# Patient Record
Sex: FEMALE | Race: WHITE | NOT HISPANIC OR LATINO | Employment: FULL TIME | URBAN - METROPOLITAN AREA
[De-identification: names, ages, dates, MRNs, and addresses within clinical notes are randomized per-mention and may not be internally consistent; named-entity substitution may affect disease eponyms.]

---

## 2023-03-25 LAB
CHLAMYDIA TRACH., AMPLIFIED: NEGATIVE
N. GONORRHEA, AMPLIFIED: NEGATIVE
TRICHOMONAS VAGINALIS: NEGATIVE
URINE CULTURE: NORMAL

## 2023-03-26 LAB
CLUE CELLS: NORMAL
NUGENT SCORE: NORMAL
YEAST: NORMAL

## 2023-06-15 ENCOUNTER — APPOINTMENT (OUTPATIENT)
Dept: PRIMARY CARE | Facility: CLINIC | Age: 23
End: 2023-06-15
Payer: COMMERCIAL

## 2023-07-11 ENCOUNTER — LAB (OUTPATIENT)
Dept: LAB | Facility: LAB | Age: 23
End: 2023-07-11
Payer: COMMERCIAL

## 2023-07-11 ENCOUNTER — OFFICE VISIT (OUTPATIENT)
Dept: PRIMARY CARE | Facility: CLINIC | Age: 23
End: 2023-07-11
Payer: COMMERCIAL

## 2023-07-11 VITALS
SYSTOLIC BLOOD PRESSURE: 124 MMHG | TEMPERATURE: 97.7 F | DIASTOLIC BLOOD PRESSURE: 78 MMHG | HEIGHT: 67 IN | HEART RATE: 64 BPM | BODY MASS INDEX: 42.22 KG/M2 | WEIGHT: 269 LBS

## 2023-07-11 DIAGNOSIS — R10.9 FLANK PAIN: ICD-10-CM

## 2023-07-11 DIAGNOSIS — R11.2 NAUSEA AND VOMITING, UNSPECIFIED VOMITING TYPE: ICD-10-CM

## 2023-07-11 DIAGNOSIS — R10.11 RUQ PAIN: Primary | ICD-10-CM

## 2023-07-11 DIAGNOSIS — R10.11 RUQ PAIN: ICD-10-CM

## 2023-07-11 LAB
ALANINE AMINOTRANSFERASE (SGPT) (U/L) IN SER/PLAS: 14 U/L (ref 7–45)
ALBUMIN (G/DL) IN SER/PLAS: 4.5 G/DL (ref 3.4–5)
ALKALINE PHOSPHATASE (U/L) IN SER/PLAS: 54 U/L (ref 33–110)
AMYLASE (U/L) IN SER/PLAS: 32 U/L (ref 29–103)
ANION GAP IN SER/PLAS: 11 MMOL/L (ref 10–20)
APPEARANCE, URINE: ABNORMAL
ASPARTATE AMINOTRANSFERASE (SGOT) (U/L) IN SER/PLAS: 14 U/L (ref 9–39)
BACTERIA, URINE: ABNORMAL /HPF
BASOPHILS (10*3/UL) IN BLOOD BY AUTOMATED COUNT: 0.05 X10E9/L (ref 0–0.1)
BASOPHILS/100 LEUKOCYTES IN BLOOD BY AUTOMATED COUNT: 0.7 % (ref 0–2)
BILIRUBIN TOTAL (MG/DL) IN SER/PLAS: 0.5 MG/DL (ref 0–1.2)
BILIRUBIN, URINE: NEGATIVE
BLOOD, URINE: NEGATIVE
C REACTIVE PROTEIN (MG/L) IN SER/PLAS: 0.8 MG/DL
CALCIUM (MG/DL) IN SER/PLAS: 9.3 MG/DL (ref 8.6–10.3)
CARBON DIOXIDE, TOTAL (MMOL/L) IN SER/PLAS: 25 MMOL/L (ref 21–32)
CHLORIDE (MMOL/L) IN SER/PLAS: 104 MMOL/L (ref 98–107)
COLOR, URINE: YELLOW
CREATININE (MG/DL) IN SER/PLAS: 0.7 MG/DL (ref 0.5–1.05)
EOSINOPHILS (10*3/UL) IN BLOOD BY AUTOMATED COUNT: 0.06 X10E9/L (ref 0–0.7)
EOSINOPHILS/100 LEUKOCYTES IN BLOOD BY AUTOMATED COUNT: 0.8 % (ref 0–6)
ERYTHROCYTE DISTRIBUTION WIDTH (RATIO) BY AUTOMATED COUNT: 12.7 % (ref 11.5–14.5)
ERYTHROCYTE MEAN CORPUSCULAR HEMOGLOBIN CONCENTRATION (G/DL) BY AUTOMATED: 31.8 G/DL (ref 32–36)
ERYTHROCYTE MEAN CORPUSCULAR VOLUME (FL) BY AUTOMATED COUNT: 90 FL (ref 80–100)
ERYTHROCYTES (10*6/UL) IN BLOOD BY AUTOMATED COUNT: 3.99 X10E12/L (ref 4–5.2)
GFR FEMALE: >90 ML/MIN/1.73M2
GLUCOSE (MG/DL) IN SER/PLAS: 91 MG/DL (ref 74–99)
GLUCOSE, URINE: NEGATIVE MG/DL
HEMATOCRIT (%) IN BLOOD BY AUTOMATED COUNT: 35.9 % (ref 36–46)
HEMOGLOBIN (G/DL) IN BLOOD: 11.4 G/DL (ref 12–16)
IMMATURE GRANULOCYTES/100 LEUKOCYTES IN BLOOD BY AUTOMATED COUNT: 0.1 % (ref 0–0.9)
KETONES, URINE: NEGATIVE MG/DL
LEUKOCYTE ESTERASE, URINE: ABNORMAL
LEUKOCYTES (10*3/UL) IN BLOOD BY AUTOMATED COUNT: 7.3 X10E9/L (ref 4.4–11.3)
LIPASE (U/L) IN SER/PLAS: 15 U/L (ref 9–82)
LYMPHOCYTES (10*3/UL) IN BLOOD BY AUTOMATED COUNT: 1.6 X10E9/L (ref 1.2–4.8)
LYMPHOCYTES/100 LEUKOCYTES IN BLOOD BY AUTOMATED COUNT: 22 % (ref 13–44)
MONOCYTES (10*3/UL) IN BLOOD BY AUTOMATED COUNT: 0.52 X10E9/L (ref 0.1–1)
MONOCYTES/100 LEUKOCYTES IN BLOOD BY AUTOMATED COUNT: 7.1 % (ref 2–10)
MUCUS, URINE: ABNORMAL /LPF
NEUTROPHILS (10*3/UL) IN BLOOD BY AUTOMATED COUNT: 5.04 X10E9/L (ref 1.2–7.7)
NEUTROPHILS/100 LEUKOCYTES IN BLOOD BY AUTOMATED COUNT: 69.3 % (ref 40–80)
NITRITE, URINE: NEGATIVE
PH, URINE: 6 (ref 5–8)
PLATELETS (10*3/UL) IN BLOOD AUTOMATED COUNT: 273 X10E9/L (ref 150–450)
POTASSIUM (MMOL/L) IN SER/PLAS: 4.2 MMOL/L (ref 3.5–5.3)
PROTEIN TOTAL: 7.3 G/DL (ref 6.4–8.2)
PROTEIN, URINE: NEGATIVE MG/DL
RBC, URINE: 2 /HPF (ref 0–5)
SODIUM (MMOL/L) IN SER/PLAS: 136 MMOL/L (ref 136–145)
SPECIFIC GRAVITY, URINE: 1.02 (ref 1–1.03)
SQUAMOUS EPITHELIAL CELLS, URINE: 4 /HPF
UREA NITROGEN (MG/DL) IN SER/PLAS: 10 MG/DL (ref 6–23)
UROBILINOGEN, URINE: <2 MG/DL (ref 0–1.9)
WBC, URINE: 5 /HPF (ref 0–5)

## 2023-07-11 PROCEDURE — 83690 ASSAY OF LIPASE: CPT

## 2023-07-11 PROCEDURE — 80053 COMPREHEN METABOLIC PANEL: CPT

## 2023-07-11 PROCEDURE — 99203 OFFICE O/P NEW LOW 30 MIN: CPT | Performed by: INTERNAL MEDICINE

## 2023-07-11 PROCEDURE — 36415 COLL VENOUS BLD VENIPUNCTURE: CPT

## 2023-07-11 PROCEDURE — 82150 ASSAY OF AMYLASE: CPT

## 2023-07-11 PROCEDURE — 85025 COMPLETE CBC W/AUTO DIFF WBC: CPT

## 2023-07-11 PROCEDURE — 81001 URINALYSIS AUTO W/SCOPE: CPT

## 2023-07-11 PROCEDURE — 86140 C-REACTIVE PROTEIN: CPT

## 2023-07-11 PROCEDURE — 1036F TOBACCO NON-USER: CPT | Performed by: INTERNAL MEDICINE

## 2023-07-11 RX ORDER — ONDANSETRON 4 MG/1
1 TABLET, ORALLY DISINTEGRATING ORAL EVERY 8 HOURS PRN
COMMUNITY
Start: 2022-07-01 | End: 2023-07-11 | Stop reason: SDUPTHER

## 2023-07-11 RX ORDER — FLUCONAZOLE 150 MG/1
TABLET ORAL
COMMUNITY
Start: 2022-07-12 | End: 2023-07-11 | Stop reason: ALTCHOICE

## 2023-07-11 RX ORDER — COPPER 313.4 MG/1
1 INTRAUTERINE DEVICE INTRAUTERINE
COMMUNITY
End: 2024-03-25 | Stop reason: WASHOUT

## 2023-07-11 RX ORDER — ONDANSETRON 4 MG/1
4 TABLET, ORALLY DISINTEGRATING ORAL EVERY 8 HOURS PRN
Qty: 20 TABLET | Refills: 2 | Status: SHIPPED | OUTPATIENT
Start: 2023-07-11

## 2023-07-11 ASSESSMENT — ENCOUNTER SYMPTOMS
MUSCULOSKELETAL NEGATIVE: 1
HEMATURIA: 0
CARDIOVASCULAR NEGATIVE: 1
ROS GI COMMENTS: SEE HPI
RESPIRATORY NEGATIVE: 1
HEMATOLOGIC/LYMPHATIC NEGATIVE: 1
NEUROLOGICAL NEGATIVE: 1
PSYCHIATRIC NEGATIVE: 1
DIFFICULTY URINATING: 0
DIARRHEA: 1

## 2023-07-11 NOTE — PROGRESS NOTES
"Subjective   Patient ID: Charlotte Moody is a 22 y.o. female who presents for Establish Care (Pt here to establish) and Abdominal Pain (Nausea right side x 7 days consistent and vomiting every 2-3 days).    Patient here to get established she has been having right flank and right upper quadrant pain and nausea vomiting diarrhea for a week no fever she is feeling nauseous she had similar episode last year and had a CAT scan which was unremarkable she does have positive Lima sign on the physical exam so we need to rule her out for gallstones and acute cholecystitis.         Review of Systems   HENT: Negative.     Respiratory: Negative.     Cardiovascular: Negative.    Gastrointestinal:  Positive for diarrhea.        See HPI   Genitourinary:  Negative for difficulty urinating and hematuria.   Musculoskeletal: Negative.    Neurological: Negative.    Hematological: Negative.    Psychiatric/Behavioral: Negative.     All other systems reviewed and are negative.      Objective   /78   Pulse 64   Temp 36.5 °C (97.7 °F) (Temporal)   Ht 1.689 m (5' 6.5\")   Wt 122 kg (269 lb)   LMP 07/03/2023   BMI 42.77 kg/m²     Physical Exam  Vitals reviewed.   Constitutional:       Appearance: Normal appearance.   HENT:      Head: Atraumatic.   Eyes:      Pupils: Pupils are equal, round, and reactive to light.   Cardiovascular:      Rate and Rhythm: Normal rate and regular rhythm.      Pulses: Normal pulses.   Pulmonary:      Effort: Pulmonary effort is normal.      Breath sounds: Normal breath sounds.   Abdominal:      Palpations: There is no mass.      Tenderness: There is abdominal tenderness. There is right CVA tenderness. There is no guarding or rebound.      Hernia: No hernia is present.   Neurological:      General: No focal deficit present.      Mental Status: She is alert and oriented to person, place, and time.         Assessment/Plan   Problem List Items Addressed This Visit       RUQ pain - Primary    " Relevant Orders    CBC and Auto Differential (Completed)    Comprehensive Metabolic Panel (Completed)    C-Reactive Protein (Completed)    Amylase (Completed)    Lipase (Completed)    US right upper quadrant    Nausea & vomiting    Relevant Medications    ondansetron ODT (Zofran-ODT) 4 mg disintegrating tablet    Other Relevant Orders    CBC and Auto Differential (Completed)    Comprehensive Metabolic Panel (Completed)    C-Reactive Protein (Completed)    Amylase (Completed)    Lipase (Completed)    US right upper quadrant    Flank pain    Relevant Orders    Urinalysis with Reflex Microscopic (Completed)     Patient will get ultrasound and blood work to rule out gallbladder disease and gallstones also with the right flank pain urinalysis will be done I have not ordered a serum pregnancy test because she has IUD and her last menstrual period was 2 weeks ago reviewed available medical records on her from EMR including her CAT scan from last year.  She should go to ER if she has her abdominal pain gets worse.

## 2023-07-18 ENCOUNTER — LAB (OUTPATIENT)
Dept: LAB | Facility: LAB | Age: 23
End: 2023-07-18
Payer: COMMERCIAL

## 2023-07-18 ENCOUNTER — OFFICE VISIT (OUTPATIENT)
Dept: PRIMARY CARE | Facility: CLINIC | Age: 23
End: 2023-07-18
Payer: COMMERCIAL

## 2023-07-18 VITALS
SYSTOLIC BLOOD PRESSURE: 122 MMHG | TEMPERATURE: 97.8 F | DIASTOLIC BLOOD PRESSURE: 76 MMHG | HEART RATE: 64 BPM | BODY MASS INDEX: 42.93 KG/M2 | WEIGHT: 270 LBS

## 2023-07-18 DIAGNOSIS — Q24.8 PERICARDIAL CYST (HHS-HCC): ICD-10-CM

## 2023-07-18 DIAGNOSIS — R10.9 FLANK PAIN: ICD-10-CM

## 2023-07-18 DIAGNOSIS — R10.9 FLANK PAIN: Primary | ICD-10-CM

## 2023-07-18 DIAGNOSIS — Z97.5 IUD (INTRAUTERINE DEVICE) IN PLACE: ICD-10-CM

## 2023-07-18 LAB
APPEARANCE, URINE: CLEAR
BILIRUBIN, URINE: NEGATIVE
BLOOD, URINE: NEGATIVE
COLOR, URINE: YELLOW
GLUCOSE, URINE: NEGATIVE MG/DL
KETONES, URINE: NEGATIVE MG/DL
LEUKOCYTE ESTERASE, URINE: NEGATIVE
NITRITE, URINE: NEGATIVE
PH, URINE: 5 (ref 5–8)
PROTEIN, URINE: NEGATIVE MG/DL
SPECIFIC GRAVITY, URINE: 1.02 (ref 1–1.03)
UROBILINOGEN, URINE: <2 MG/DL (ref 0–1.9)

## 2023-07-18 PROCEDURE — 82525 ASSAY OF COPPER: CPT

## 2023-07-18 PROCEDURE — 36415 COLL VENOUS BLD VENIPUNCTURE: CPT

## 2023-07-18 PROCEDURE — 81003 URINALYSIS AUTO W/O SCOPE: CPT

## 2023-07-18 PROCEDURE — 99213 OFFICE O/P EST LOW 20 MIN: CPT | Performed by: INTERNAL MEDICINE

## 2023-07-18 PROCEDURE — 1036F TOBACCO NON-USER: CPT | Performed by: INTERNAL MEDICINE

## 2023-07-18 PROCEDURE — 87086 URINE CULTURE/COLONY COUNT: CPT

## 2023-07-18 RX ORDER — FAMOTIDINE 20 MG/1
20 TABLET, FILM COATED ORAL 2 TIMES DAILY
COMMUNITY
Start: 2023-07-14 | End: 2024-03-25 | Stop reason: WASHOUT

## 2023-07-18 ASSESSMENT — ENCOUNTER SYMPTOMS
CARDIOVASCULAR NEGATIVE: 1
CONSTITUTIONAL NEGATIVE: 1
RESPIRATORY NEGATIVE: 1

## 2023-07-18 NOTE — PROGRESS NOTES
"Subjective   Patient ID: Charlotte Moody is a 22 y.o. female who presents for Follow-up (Pt here for Emergency dept follow up. Abdominal pain nausea and vomiting  Found a \"cyst\" on the heart through CT).    Patient is a hospital ER follow-up she had gone to ER for abdominal pain nausea vomiting CAT scan and ultrasound were negative for any acute pancreatic or gallbladder issues she is still having nausea but no abdominal pain she does have a copper IUD and that could potentially be the reason.  She does have right flank pain         Review of Systems   Constitutional: Negative.    HENT: Negative.     Respiratory: Negative.     Cardiovascular: Negative.    Gastrointestinal:         See HPI,Still has Nausea       Objective   /76   Pulse 64   Temp 36.6 °C (97.8 °F) (Temporal)   Wt 122 kg (270 lb)   LMP 07/03/2023   BMI 42.93 kg/m²     Physical Exam  Constitutional:       Appearance: Normal appearance. She is obese.   HENT:      Head: Atraumatic.   Eyes:      Conjunctiva/sclera: Conjunctivae normal.   Abdominal:      Palpations: Abdomen is soft. There is no mass.      Tenderness: There is right CVA tenderness.   Musculoskeletal:      Right lower leg: No edema.      Left lower leg: No edema.   Neurological:      General: No focal deficit present.      Mental Status: She is alert. Mental status is at baseline.       Assessment/Plan   Problem List Items Addressed This Visit       Flank pain - Primary    Relevant Orders    Urinalysis with Reflex Microscopic (Completed)    Urine Culture (Completed)    Pericardial cyst    Relevant Orders    Referral to Cardiology    IUD (intrauterine device) in place    Relevant Orders    Copper, serum (Completed)     Patient will have urinalysis and culture since she had abnormal urine analysis repeat urinalysis is unremarkable she will also be referred to GYN regarding her potential issues with the copper IUD.  I reviewed her ER records she had a CAT scan which showed " pericardial cyst which is an incidental finding she will be referred to cardiology for their opinion and echocardiogram.

## 2023-07-20 LAB
COPPER: 138.6 UG/DL (ref 80–155)
URINE CULTURE: NORMAL

## 2023-07-23 PROBLEM — Q24.8 PERICARDIAL CYST (HHS-HCC): Status: ACTIVE | Noted: 2023-07-23

## 2023-07-23 PROBLEM — Z97.5 IUD (INTRAUTERINE DEVICE) IN PLACE: Status: ACTIVE | Noted: 2023-07-23

## 2023-07-25 ENCOUNTER — APPOINTMENT (OUTPATIENT)
Dept: PRIMARY CARE | Facility: CLINIC | Age: 23
End: 2023-07-25
Payer: COMMERCIAL

## 2023-08-23 PROBLEM — M62.81 MUSCLE WEAKNESS: Status: ACTIVE | Noted: 2018-07-12

## 2023-08-23 PROBLEM — M65.4 DE QUERVAIN'S TENOSYNOVITIS, RIGHT: Status: ACTIVE | Noted: 2020-06-18

## 2023-08-23 PROBLEM — N63.20 LEFT BREAST LUMP: Status: ACTIVE | Noted: 2023-08-23

## 2023-08-23 PROBLEM — R30.0 DYSURIA: Status: ACTIVE | Noted: 2023-08-23

## 2023-08-23 PROBLEM — N93.9 ABNORMAL VAGINAL BLEEDING: Status: ACTIVE | Noted: 2023-08-23

## 2023-08-23 PROBLEM — B37.2 CANDIDAL SKIN INFECTION: Status: ACTIVE | Noted: 2023-08-23

## 2023-08-23 PROBLEM — B37.31 VAGINAL CANDIDIASIS: Status: ACTIVE | Noted: 2023-08-23

## 2023-08-23 PROBLEM — N39.0 FREQUENT UTI: Status: ACTIVE | Noted: 2023-08-23

## 2023-08-23 RX ORDER — LIDOCAINE HYDROCHLORIDE 20 MG/ML
SOLUTION OROPHARYNGEAL
COMMUNITY
Start: 2022-06-08 | End: 2024-03-25 | Stop reason: WASHOUT

## 2023-08-23 RX ORDER — FLUTICASONE PROPIONATE 50 MCG
1 SPRAY, SUSPENSION (ML) NASAL
COMMUNITY
Start: 2023-04-19 | End: 2024-03-25 | Stop reason: WASHOUT

## 2023-08-23 RX ORDER — METRONIDAZOLE 7.5 MG/G
1 GEL VAGINAL NIGHTLY
COMMUNITY
Start: 2023-03-24 | End: 2024-03-25 | Stop reason: WASHOUT

## 2023-08-23 RX ORDER — DICYCLOMINE HYDROCHLORIDE 10 MG/1
1 CAPSULE ORAL 3 TIMES DAILY PRN
COMMUNITY
Start: 2023-07-14 | End: 2024-03-25 | Stop reason: WASHOUT

## 2023-08-23 RX ORDER — CLOTRIMAZOLE AND BETAMETHASONE DIPROPIONATE 10; .64 MG/G; MG/G
1 CREAM TOPICAL 2 TIMES DAILY
COMMUNITY
Start: 2023-03-24 | End: 2024-03-25 | Stop reason: WASHOUT

## 2023-08-23 RX ORDER — PROMETHAZINE HYDROCHLORIDE 12.5 MG/1
1 TABLET ORAL EVERY 8 HOURS PRN
COMMUNITY
Start: 2023-07-14 | End: 2024-03-25 | Stop reason: WASHOUT

## 2023-09-28 LAB — SARS-COV-2 RESULT: DETECTED

## 2023-10-03 ENCOUNTER — APPOINTMENT (OUTPATIENT)
Dept: OBSTETRICS AND GYNECOLOGY | Facility: CLINIC | Age: 23
End: 2023-10-03
Payer: COMMERCIAL

## 2023-10-18 ENCOUNTER — APPOINTMENT (OUTPATIENT)
Dept: PRIMARY CARE | Facility: CLINIC | Age: 23
End: 2023-10-18
Payer: COMMERCIAL

## 2023-10-31 ENCOUNTER — APPOINTMENT (OUTPATIENT)
Dept: PRIMARY CARE | Facility: CLINIC | Age: 23
End: 2023-10-31
Payer: COMMERCIAL

## 2023-11-17 ENCOUNTER — OFFICE VISIT (OUTPATIENT)
Dept: ORTHOPEDIC SURGERY | Facility: CLINIC | Age: 23
End: 2023-11-17
Payer: COMMERCIAL

## 2023-11-17 DIAGNOSIS — M65.4 DE QUERVAIN'S TENOSYNOVITIS: ICD-10-CM

## 2023-11-17 PROCEDURE — 20550 NJX 1 TENDON SHEATH/LIGAMENT: CPT | Performed by: ORTHOPAEDIC SURGERY

## 2023-11-17 PROCEDURE — 1036F TOBACCO NON-USER: CPT | Performed by: ORTHOPAEDIC SURGERY

## 2023-11-17 PROCEDURE — L3924 HFO WITHOUT JOINTS PRE OTS: HCPCS | Performed by: ORTHOPAEDIC SURGERY

## 2023-11-17 PROCEDURE — 99214 OFFICE O/P EST MOD 30 MIN: CPT | Performed by: ORTHOPAEDIC SURGERY

## 2023-11-17 RX ORDER — BETAMETHASONE SODIUM PHOSPHATE AND BETAMETHASONE ACETATE 3; 3 MG/ML; MG/ML
6 INJECTION, SUSPENSION INTRA-ARTICULAR; INTRALESIONAL; INTRAMUSCULAR; SOFT TISSUE
Status: COMPLETED | OUTPATIENT
Start: 2023-11-17 | End: 2023-11-17

## 2023-11-17 RX ADMIN — BETAMETHASONE SODIUM PHOSPHATE AND BETAMETHASONE ACETATE 6 MG: 3; 3 INJECTION, SUSPENSION INTRA-ARTICULAR; INTRALESIONAL; INTRAMUSCULAR; SOFT TISSUE at 16:29

## 2023-11-17 ASSESSMENT — PAIN SCALES - GENERAL: PAINLEVEL_OUTOF10: 4

## 2023-11-17 ASSESSMENT — PAIN - FUNCTIONAL ASSESSMENT: PAIN_FUNCTIONAL_ASSESSMENT: 0-10

## 2023-11-17 NOTE — PROGRESS NOTES
History of Present Illness   The patient is here for her right wrist.  She has de Quervain's tenosynovitis.  I injected her and gave her a Comfort Cool splint.  The injection helped.  She is about 70% better but still has some pain.  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  She is pointing along the radial aspect of the wrist and thumb where she is getting her pain.     Review of Systems   GENERAL: Negative for malaise, significant weight loss, fever  MUSCULOSKELETAL: see HPI  NEURO:  Negative     Physical Exam  This is a female in no acute distress  Right wrist:  The skin is intact  There is no erythema or warmth  Finklestein's test is positive  She has some pain with thumb extension and abduction.  Radial pulses 2+ and palpable     Imaging  None today     Assessment   De Quervain's tenosynovitis right wrist     Plan  We will try another injection today into the first dorsal extensor compartment  Another Comfort Cool splint was supplied  Follow-up in 6 weeks, sooner if there is any problems  All questions answered    Hand / UE Inj/Asp: R extensor compartment 1 for de Quervain's tenosynovitis on 11/17/2023 4:29 PM  Indications: pain  Details: 25 G needle, radial approach  Medications: 6 mg betamethasone acet,sod phos 6 mg/mL

## 2023-12-29 ENCOUNTER — OFFICE VISIT (OUTPATIENT)
Dept: ORTHOPEDIC SURGERY | Facility: CLINIC | Age: 23
End: 2023-12-29
Payer: COMMERCIAL

## 2023-12-29 DIAGNOSIS — M65.4 DE QUERVAIN'S TENOSYNOVITIS: Primary | ICD-10-CM

## 2023-12-29 PROCEDURE — 99212 OFFICE O/P EST SF 10 MIN: CPT | Performed by: ORTHOPAEDIC SURGERY

## 2023-12-29 PROCEDURE — 1036F TOBACCO NON-USER: CPT | Performed by: ORTHOPAEDIC SURGERY

## 2023-12-29 NOTE — PROGRESS NOTES
History of Present Illness   She is here for follow-up of her right wrist de Quervain's tenosynovitis.  At the last visit she was given a Comfort Cool brace and a corticosteroid injection.  She states this has helped and she has no pain.     Review of Systems   GENERAL: Negative for malaise, significant weight loss, fever  MUSCULOSKELETAL: see HPI  NEURO:  Negative     Physical Exam  Right wrist  Skin is intact without any evidence of erythema ecchymosis or effusion  No tenderness to palpation  Full range of motion at the wrist  Negative Finkelstein's test     Imaging  None today     Assessment   Right wrist de Quervain's tenosynovitis, resolved     Plan  Follow-up as needed

## 2024-03-13 ENCOUNTER — TELEPHONE (OUTPATIENT)
Dept: OBSTETRICS AND GYNECOLOGY | Facility: CLINIC | Age: 24
End: 2024-03-13

## 2024-03-13 NOTE — TELEPHONE ENCOUNTER
5.5 wk EP NOB called ob line c/o light bleeding/spotting for the past week.  LMP 2024   Per patient, she has h/o PCOS symptoms and has struggled on getting pregnant.  Patient denies cramping or pain.  Denies constipation or recent intercourse.  Patient is requesting to have blood work done to check her pregnancy hormones to make sure they are going in the right direction.    Patient assured that spotting and/or cramping in the first part of pregnancy is not uncommon.    Patient assured that I will send a message onto Alma, whom she is scheduled to see for NOB on , and give her a call back.    In the meantime, patient should monitor the spotting/light bleeding and call if she starts saturating a pad every hour for 3 hours and/or intense cramping or pain.  Please advise

## 2024-03-25 ENCOUNTER — OFFICE VISIT (OUTPATIENT)
Dept: OBSTETRICS AND GYNECOLOGY | Facility: CLINIC | Age: 24
End: 2024-03-25
Payer: COMMERCIAL

## 2024-03-25 ENCOUNTER — HOSPITAL ENCOUNTER (OUTPATIENT)
Dept: RADIOLOGY | Facility: CLINIC | Age: 24
Discharge: HOME | End: 2024-03-25
Payer: COMMERCIAL

## 2024-03-25 VITALS
BODY MASS INDEX: 42.51 KG/M2 | HEIGHT: 66 IN | WEIGHT: 264.5 LBS | DIASTOLIC BLOOD PRESSURE: 84 MMHG | SYSTOLIC BLOOD PRESSURE: 122 MMHG

## 2024-03-25 DIAGNOSIS — Z29.13 NEED FOR RHOGAM DUE TO RH NEGATIVE MOTHER: ICD-10-CM

## 2024-03-25 DIAGNOSIS — O20.9 VAGINAL BLEEDING AFFECTING EARLY PREGNANCY (HHS-HCC): Primary | ICD-10-CM

## 2024-03-25 DIAGNOSIS — O20.9 VAGINAL BLEEDING AFFECTING EARLY PREGNANCY (HHS-HCC): ICD-10-CM

## 2024-03-25 LAB
ABO GROUP (TYPE) IN BLOOD: NORMAL
ANTIBODY SCREEN: NORMAL
B-HCG SERPL-ACNC: ABNORMAL MIU/ML
PREGNANCY TEST URINE, POC: POSITIVE
RH FACTOR (ANTIGEN D): NORMAL

## 2024-03-25 PROCEDURE — 86901 BLOOD TYPING SEROLOGIC RH(D): CPT

## 2024-03-25 PROCEDURE — 1036F TOBACCO NON-USER: CPT | Performed by: ADVANCED PRACTICE MIDWIFE

## 2024-03-25 PROCEDURE — 81025 URINE PREGNANCY TEST: CPT | Performed by: ADVANCED PRACTICE MIDWIFE

## 2024-03-25 PROCEDURE — 99213 OFFICE O/P EST LOW 20 MIN: CPT | Performed by: ADVANCED PRACTICE MIDWIFE

## 2024-03-25 PROCEDURE — 84702 CHORIONIC GONADOTROPIN TEST: CPT

## 2024-03-25 PROCEDURE — 96372 THER/PROPH/DIAG INJ SC/IM: CPT | Performed by: ADVANCED PRACTICE MIDWIFE

## 2024-03-25 PROCEDURE — 76801 OB US < 14 WKS SINGLE FETUS: CPT

## 2024-03-25 PROCEDURE — 86900 BLOOD TYPING SEROLOGIC ABO: CPT

## 2024-03-25 PROCEDURE — 76801 OB US < 14 WKS SINGLE FETUS: CPT | Performed by: OBSTETRICS & GYNECOLOGY

## 2024-03-25 PROCEDURE — 86850 RBC ANTIBODY SCREEN: CPT

## 2024-03-25 PROCEDURE — 76817 TRANSVAGINAL US OBSTETRIC: CPT

## 2024-03-25 PROCEDURE — 36415 COLL VENOUS BLD VENIPUNCTURE: CPT

## 2024-03-25 PROCEDURE — 76817 TRANSVAGINAL US OBSTETRIC: CPT | Performed by: OBSTETRICS & GYNECOLOGY

## 2024-03-25 NOTE — PROGRESS NOTES
"Add on for c/o vaginal bleeding in early pregnancy.  with an  LMP of 24, making her 7w4d today. She and her partner have been trying to get pregnant, she is feeling very anxious about the bleeding. Evelyn reports \"Light cramping\" that comes and goes, bleeding has been consistent since Friday. Was seen in the ED on 3/15/2024 for bleeding at 6 weeks, TVUS indicated IUP and likely a subchorionic hematoma.   Pt is RH negative and WAS NOT given RhoGAM in the ED. Given bleeding seen today, will draw T&S and administer RhoGAM.   Beta hCG was also ordered today and pt was added to the M U/S schedule for a viability U/S later today.     Return to care as scheduled for NOB visit should viability be confirmed by [facility].     NIECY Cloud-LEONA    "

## 2024-03-26 NOTE — TELEPHONE ENCOUNTER
Patient called ob line asking to speak with Alma.  She had a formal scan done yesterday and was advised that she had a miscarriage.  Her bleeding yesterday, after scan, was very heavy with lots of cramping/pain, she did end up passing large clots.  Today her bleeding is moreso like a light period with mild cramping.    Patient is wondering what her next steps are?  Should she be having more blood work done to make she her hcg goes down? Should she come back in for office visit    Please advise

## 2024-04-09 ENCOUNTER — OFFICE VISIT (OUTPATIENT)
Dept: OBSTETRICS AND GYNECOLOGY | Facility: CLINIC | Age: 24
End: 2024-04-09
Payer: COMMERCIAL

## 2024-04-09 ENCOUNTER — APPOINTMENT (OUTPATIENT)
Dept: OBSTETRICS AND GYNECOLOGY | Facility: CLINIC | Age: 24
End: 2024-04-09
Payer: COMMERCIAL

## 2024-04-09 VITALS — WEIGHT: 273.25 LBS | DIASTOLIC BLOOD PRESSURE: 60 MMHG | SYSTOLIC BLOOD PRESSURE: 124 MMHG | BODY MASS INDEX: 44.1 KG/M2

## 2024-04-09 DIAGNOSIS — O03.9 MISCARRIAGE (HHS-HCC): ICD-10-CM

## 2024-04-09 PROCEDURE — 1036F TOBACCO NON-USER: CPT | Performed by: ADVANCED PRACTICE MIDWIFE

## 2024-04-09 PROCEDURE — 99213 OFFICE O/P EST LOW 20 MIN: CPT | Performed by: ADVANCED PRACTICE MIDWIFE

## 2024-04-09 NOTE — PROGRESS NOTES
GYN Problem note  Charlotte Moody  is a 23 y.o. who presents with   Chief Complaint   Patient presents with    Follow-up     MAB         s/p complete SAB presents today for F/U visit. Was seen for cramping and bleeding with positive UPT at home on 3/25. Since then, Evelyn reports bleeding and cramping increased and she feels she passed everything 3/27. No bleeding in over 1 week.   Was given RhoGAM when seen on 3/25. Found out her partner is Rh +.  Discussed plans for starting a family. 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 Plans to give herself a few months, focus on school and passing the NCLEX, then start trying again.   Encouraged to continue PNVs daily and track menstrual cycles.     Physical Exam   Physical Exam  Constitutional:       Appearance: Normal appearance.   HENT:      Head: Normocephalic and atraumatic.   Pulmonary:      Effort: Pulmonary effort is normal.   Neurological:      General: No focal deficit present.      Mental Status: She is alert and oriented to person, place, and time.   Psychiatric:         Mood and Affect: Mood normal.         Behavior: Behavior normal.   Vitals reviewed.          Visit Vitals  /60            Assessment/Plan   F/U to complete SAB  MFM U/S performed on 3/25 found no evidence of gestational sac but findings were not conclusive for complete AB. Given pt's report of passing tissue and bleeding, then no bleeding in over 1 week, likely passed all POC.   Beta hCG today, if neg, no further f/u necessary. If positive, repeat urine preg test at home in 1 week, continue until negative result  Continue taking PNVs and tracking periods.     Return to care as needed      DAYO Cloud

## 2024-04-15 ENCOUNTER — LAB (OUTPATIENT)
Dept: LAB | Facility: LAB | Age: 24
End: 2024-04-15
Payer: COMMERCIAL

## 2024-04-15 DIAGNOSIS — O03.9 MISCARRIAGE (HHS-HCC): ICD-10-CM

## 2024-04-15 LAB — B-HCG SERPL-ACNC: 7 MIU/ML

## 2024-04-15 PROCEDURE — 36415 COLL VENOUS BLD VENIPUNCTURE: CPT

## 2024-04-15 PROCEDURE — 84702 CHORIONIC GONADOTROPIN TEST: CPT

## 2024-06-20 ENCOUNTER — TELEPHONE (OUTPATIENT)
Dept: OBSTETRICS AND GYNECOLOGY | Facility: CLINIC | Age: 24
End: 2024-06-20

## 2024-06-20 NOTE — TELEPHONE ENCOUNTER
Patient left message on ob line requesting to have BHCG drawn . Recent + UPT, unsure of LMP due to recent miscarriage.    Please advise

## 2024-07-22 ENCOUNTER — APPOINTMENT (OUTPATIENT)
Dept: OBSTETRICS AND GYNECOLOGY | Facility: CLINIC | Age: 24
End: 2024-07-22
Payer: COMMERCIAL

## 2025-01-02 ENCOUNTER — OFFICE VISIT (OUTPATIENT)
Dept: ORTHOPEDIC SURGERY | Facility: CLINIC | Age: 25
End: 2025-01-02
Payer: COMMERCIAL

## 2025-01-02 ENCOUNTER — APPOINTMENT (OUTPATIENT)
Dept: RADIOLOGY | Facility: HOSPITAL | Age: 25
End: 2025-01-02
Payer: COMMERCIAL

## 2025-01-02 ENCOUNTER — HOSPITAL ENCOUNTER (EMERGENCY)
Facility: HOSPITAL | Age: 25
Discharge: HOME | End: 2025-01-02
Payer: COMMERCIAL

## 2025-01-02 VITALS
OXYGEN SATURATION: 98 % | HEIGHT: 66 IN | RESPIRATION RATE: 18 BRPM | TEMPERATURE: 97.7 F | SYSTOLIC BLOOD PRESSURE: 150 MMHG | BODY MASS INDEX: 46.61 KG/M2 | DIASTOLIC BLOOD PRESSURE: 76 MMHG | WEIGHT: 290 LBS | HEART RATE: 102 BPM

## 2025-01-02 VITALS — BODY MASS INDEX: 46.61 KG/M2 | WEIGHT: 290 LBS | HEIGHT: 66 IN

## 2025-01-02 DIAGNOSIS — S93.402A SPRAIN OF LEFT ANKLE, UNSPECIFIED LIGAMENT, INITIAL ENCOUNTER: Primary | ICD-10-CM

## 2025-01-02 DIAGNOSIS — S93.402A SPRAIN OF LEFT ANKLE, UNSPECIFIED LIGAMENT, INITIAL ENCOUNTER: ICD-10-CM

## 2025-01-02 PROCEDURE — 73610 X-RAY EXAM OF ANKLE: CPT | Mod: LT

## 2025-01-02 PROCEDURE — 1036F TOBACCO NON-USER: CPT | Performed by: FAMILY MEDICINE

## 2025-01-02 PROCEDURE — 73610 X-RAY EXAM OF ANKLE: CPT | Mod: LEFT SIDE | Performed by: RADIOLOGY

## 2025-01-02 PROCEDURE — 99213 OFFICE O/P EST LOW 20 MIN: CPT | Performed by: FAMILY MEDICINE

## 2025-01-02 PROCEDURE — 2500000001 HC RX 250 WO HCPCS SELF ADMINISTERED DRUGS (ALT 637 FOR MEDICARE OP): Performed by: REGISTERED NURSE

## 2025-01-02 PROCEDURE — 3008F BODY MASS INDEX DOCD: CPT | Performed by: FAMILY MEDICINE

## 2025-01-02 PROCEDURE — 99283 EMERGENCY DEPT VISIT LOW MDM: CPT | Mod: 27

## 2025-01-02 PROCEDURE — L4361 PNEUMA/VAC WALK BOOT PRE OTS: HCPCS | Performed by: FAMILY MEDICINE

## 2025-01-02 RX ORDER — ACETAMINOPHEN 325 MG/1
975 TABLET ORAL ONCE
Status: COMPLETED | OUTPATIENT
Start: 2025-01-02 | End: 2025-01-02

## 2025-01-02 RX ADMIN — ACETAMINOPHEN 975 MG: 325 TABLET ORAL at 07:24

## 2025-01-02 ASSESSMENT — PAIN DESCRIPTION - DESCRIPTORS: DESCRIPTORS: ACHING

## 2025-01-02 ASSESSMENT — PAIN DESCRIPTION - ONSET: ONSET: SUDDEN

## 2025-01-02 ASSESSMENT — LIFESTYLE VARIABLES
EVER HAD A DRINK FIRST THING IN THE MORNING TO STEADY YOUR NERVES TO GET RID OF A HANGOVER: NO
HAVE YOU EVER FELT YOU SHOULD CUT DOWN ON YOUR DRINKING: NO
HAVE PEOPLE ANNOYED YOU BY CRITICIZING YOUR DRINKING: NO
TOTAL SCORE: 0
EVER FELT BAD OR GUILTY ABOUT YOUR DRINKING: NO

## 2025-01-02 ASSESSMENT — PAIN DESCRIPTION - ORIENTATION
ORIENTATION: LEFT
ORIENTATION: LEFT

## 2025-01-02 ASSESSMENT — PAIN DESCRIPTION - LOCATION
LOCATION: ANKLE
LOCATION: ANKLE

## 2025-01-02 ASSESSMENT — COLUMBIA-SUICIDE SEVERITY RATING SCALE - C-SSRS
1. IN THE PAST MONTH, HAVE YOU WISHED YOU WERE DEAD OR WISHED YOU COULD GO TO SLEEP AND NOT WAKE UP?: NO
6. HAVE YOU EVER DONE ANYTHING, STARTED TO DO ANYTHING, OR PREPARED TO DO ANYTHING TO END YOUR LIFE?: NO
2. HAVE YOU ACTUALLY HAD ANY THOUGHTS OF KILLING YOURSELF?: NO

## 2025-01-02 ASSESSMENT — PAIN SCALES - GENERAL
PAINLEVEL_OUTOF10: 3
PAINLEVEL_OUTOF10: 7

## 2025-01-02 ASSESSMENT — PAIN DESCRIPTION - FREQUENCY: FREQUENCY: CONSTANT/CONTINUOUS

## 2025-01-02 ASSESSMENT — PAIN DESCRIPTION - PAIN TYPE
TYPE: ACUTE PAIN
TYPE: ACUTE PAIN

## 2025-01-02 ASSESSMENT — PAIN DESCRIPTION - PROGRESSION: CLINICAL_PROGRESSION: NOT CHANGED

## 2025-01-02 ASSESSMENT — PAIN - FUNCTIONAL ASSESSMENT
PAIN_FUNCTIONAL_ASSESSMENT: 0-10
PAIN_FUNCTIONAL_ASSESSMENT: 0-10

## 2025-01-02 NOTE — LETTER
January 2, 2025     Patient: Charlotte Moody   YOB: 2000   Date of Visit: 1/2/2025       To Whom It May Concern:    It is my medical opinion that Charlotte Moody may return to light duty immediately with the following restrictions: Sit down work only for 3 weeks .    If you have any questions or concerns, please don't hesitate to call.         Sincerely,         Cole C Budinsky, MD

## 2025-01-02 NOTE — Clinical Note
Charlotte Moody was seen and treated in our emergency department on 1/2/2025.  She may return to work on 01/04/2025.  RICE    Rest: Stop using the injured area and avoid putting weight on it. You may need to use crutches or a splint.   Ice: Apply ice or a cold pack to the injured area as soon as possible to reduce swelling and bleeding. Ice the area for 15 to 20 minutes, 4 to 8 times a day, for the first 48 hours.   Compression: Wrap the injured area with an elastic bandage.   Elevation: Prop up the injured area      If you have any questions or concerns, please don't hesitate to call.      Betty Ng, APRN-CNP

## 2025-01-02 NOTE — ED PROVIDER NOTES
HPI   Chief Complaint   Patient presents with    Fall     I fell going to fill up my water cup I hurt my left ankle ,  I am 32 weeks pregnant      24-year-old female M para 0 presents emergency department today for evaluation status post fall.  Patient tells me she was ambulating in the hallway, she tells me that there is nothing on the floor however she felt her ankle started to roll and continue to step through it.  Patient tells me that she fell forward landing on both of her knees and her hands.  Patient is 32 weeks pregnant she tells me that she did not hit her belly.  She tells me that she has felt the baby move since falling.  Patient tells me that she is experiencing pain primarily in the left ankle, patient rates her pain at a 7/10 on a pain scale.  Patient denies striking her head or use of blood thinners or loss of consciousness.  Patient has no other complaints on arrival.      History provided by:  Patient and medical records   used: No            Patient History   Past Medical History:   Diagnosis Date    Other instability, right ankle 03/10/2015    Instability of right ankle joint    Other specified joint disorders, unspecified ankle and foot 04/14/2015    Ankle impingement syndrome    Pain in unspecified ankle and joints of unspecified foot 04/14/2015    Ankle pain    Personal history of other (healed) physical injury and trauma 05/12/2015    History of sprain of ankle    Personal history of urinary calculi 05/21/2020    History of kidney stones    Sprain of calcaneofibular ligament of unspecified ankle, initial encounter 04/14/2015    Calcaneofibular (ligament) ankle sprain     No past surgical history on file.  Family History   Problem Relation Name Age of Onset    No Known Problems Mother      Hypertension Father       Social History     Tobacco Use    Smoking status: Never    Smokeless tobacco: Never   Substance Use Topics    Alcohol use: Yes     Comment: social    Drug use:  Never       Physical Exam   ED Triage Vitals [01/02/25 0702]   Temperature Heart Rate Respirations BP   36.5 °C (97.7 °F) (!) 102 18 150/76      Pulse Ox Temp Source Heart Rate Source Patient Position   100 % Temporal Monitor Sitting      BP Location FiO2 (%)     Right arm --       Physical Exam  Vitals and nursing note reviewed.   Constitutional:       Appearance: Normal appearance.   HENT:      Head: Normocephalic and atraumatic.   Cardiovascular:      Rate and Rhythm: Normal rate.      Pulses: Normal pulses.   Pulmonary:      Effort: Pulmonary effort is normal.   Skin:     Capillary Refill: Capillary refill takes less than 2 seconds.   Neurological:      General: No focal deficit present.      Mental Status: She is alert and oriented to person, place, and time.   Psychiatric:         Mood and Affect: Mood normal.         Behavior: Behavior normal.           ED Course & MDM   Diagnoses as of 01/02/25 0755   Sprain of left ankle, unspecified ligament, initial encounter                 No data recorded     Mariano Coma Scale Score: 15 (01/02/25 0707 : Diana San RN)                           Medical Decision Making    Patient seen in the emergency department; patient is healthy nontoxic and has not had any acute distress.  Patient's respirations are even unlabored, skin, dry no diaphoresis noted.  Patient neurologically intact, no focal deficits.  Patient has no pain with palpation of movement in her bilateral knees.  Patient does have swelling noted to the lateral aspect of the left ankle.  MSPs are intact distally.  Patient has decreased range of motion of the left ankle.  Patient strong pedal pulse.    Will have nurses evaluate for 2 fetal heart tones.  Will treat patient's symptoms with acetaminophen and obtain imaging of left ankle to evaluate for osseous abnormality.  Patient agreement medical plan assessment.    Imaging of left ankle vomiting significant findings per radiology read.  Upon reevaluation  patient is feeling better post administration of acetaminophen.  Bedside nurse is attempting fetal heart tones which were approximately 150.  Patient tells me that she continues to feel the baby moving.  Patient updated that she does not have a fracture likely just has a left ankle sprain.  Patient's left ankle placed in Ace wrap by bedside nurse, MSPs intact before and after placement.  Patient recommended follow-up with orthopedics if symptoms persist over the next 3 to 5 days.  We also discussed the use of RICE for symptom management at home.  All patient's questions and concerns were addressed prior to discharge.  Patient discharged in stable condition.    Labs Reviewed - No data to display    XR ankle left 3+ views   Final Result   No acute findings.   Signed by Hussain Bennett MD            Procedure  Procedures     Betty Ng, NIECY-CNP  01/02/25 0759

## 2025-01-02 NOTE — PROGRESS NOTES
"  Acute Injury New Patient Visit    CC:   Chief Complaint   Patient presents with    Left Ankle - Pain, Worker's Compensation     Rolled Ankle Walking - X-Rays @ [facility]        HPI: Charlotte \"Serena" is a 24 y.o.female who presents today with new complaints of acute pain discomfort to the lateral side of the ankle.  She had injured her self while walking at work.  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  She rolled her ankle earlier today went to the emergency department where x-rays were negative.  She presents here today for repeat evaluation and further management.  History of severe right ankle sprains in the past, no history of any left-sided issues [facility] claim.        Review of Systems   GENERAL: Negative for malaise, significant weight loss, fever  MUSCULOSKELETAL: See HPI  NEURO: Negative for numbness / tingling     Past Medical History  Past Medical History:   Diagnosis Date    Other instability, right ankle 03/10/2015    Instability of right ankle joint    Other specified joint disorders, unspecified ankle and foot 2015    Ankle impingement syndrome    Pain in unspecified ankle and joints of unspecified foot 2015    Ankle pain    Personal history of other (healed) physical injury and trauma 2015    History of sprain of ankle    Personal history of urinary calculi 2020    History of kidney stones    Sprain of calcaneofibular ligament of unspecified ankle, initial encounter 2015    Calcaneofibular (ligament) ankle sprain       Medication review  Medication Documentation Review Audit       Reviewed by Cole C Budinsky, MD (Physician) on 25 at 1259      Medication Order Taking? Sig Documenting Provider Last Dose Status   acetaminophen (Tylenol) tablet 975 mg [id number]   Betty Ng, APRN-CNP   25 0724   ondansetron ODT (Zofran-ODT) 4 mg disintegrating tablet [id number] No Take 1 tablet (4 mg) by " mouth every 8 hours if needed for nausea or vomiting. As needed Sheeba Jackson MD Taking Active   prenatal no115/iron/folic acid (PRENATAL 19 ORAL) [id number]  Take 1 tablet by mouth once daily. Historical Provider, MD  Active                    Allergies  No Active Allergies    Social History  Social History     Socioeconomic History    Marital status: Single     Spouse name: Not on file    Number of children: Not on file    Years of education: Not on file    Highest education level: Not on file   Occupational History    Not on file   Tobacco Use    Smoking status: Never    Smokeless tobacco: Never   Substance and Sexual Activity    Alcohol use: Yes     Comment: social    Drug use: Never    Sexual activity: Not on file   Other Topics Concern    Not on file   Social History Narrative    Not on file     Social Drivers of Health     Financial Resource Strain: Not on file   Food Insecurity: Not on file   Transportation Needs: Not on file   Physical Activity: Not on file   Stress: Not on file   Social Connections: Not on file   Intimate Partner Violence: Not on file       Surgical History  History reviewed. No pertinent surgical history.    Physical Exam:  GENERAL:  Patient is awake, alert, and oriented to person place and time.  Patient appears well nourished and well kept.  Affect Calm, Not Acutely Distressed.  HEENT:  Normocephalic, Atraumatic, EOMI  CARDIOVASCULAR:  Hemodynamically stable.  RESPIRATORY:  Normal respirations with unlabored breathing.  NEURO: Gross sensation intact to the lower extremities bilaterally.  Extremity: Left ankle exam: The affected ankle was examined and inspected.  There was evidence of lateral sided soft tissue swelling and fullness with mild bruising noted.  The distal fibula had mild tenderness to palpation, the distal medial malleolus was non-tender.  Tenderness across the anterior joint space and over the soft tissues in the area of the ATFL and CFL ligaments.  Negative Heel  Tap and Calcaneal Squeeze, Achilles is non-tender.  Negative Queenie´s and Alvarez sign.  Negative Midfoot and distal metatarsal squeeze.  Distal pulses and sensation are intact with good distal cap refill.  Active and passive ROM and strength about the ankle is limited with Dorsiflexion, Plantarflexion, Eversion, and Inversion. Unable to tolerate full weight bearing secondary to pain.      Diagnostics: X-rays today as below  XR ankle left 3+ views          STUDY:  Ankle Radiographs;  1/2/2025 7:21AM  INDICATION:  Patient had a fall.  COMPARISON:  None available.  ACCESSION NUMBER(S):  [id number]  ORDERING CLINICIAN:  COOPER MCKEON  TECHNIQUE:  Three view(s) of the left ankle.  FINDINGS:    There is no displaced fracture.  The alignment is anatomic.  No soft  tissue abnormality is seen.  IMPRESSION:  No acute findings.  Signed by Hussain Bennett MD             Procedure: None  Procedures    Assessment:   Problem List Items Addressed This Visit    None  Visit Diagnoses       Sprain of left ankle, unspecified ligament, initial encounter        Relevant Orders    Walking Boot Tall    Ankle Brace, Lace Up or A60             Plan: At this time we will offer the patient a tall walking boot in addition to pre-CERT him for a lace up ankle brace.  Will also submit C9 prior authorization for physical therapy.  Discussed with the patient likely moderate sprain with no obvious or convincing presence for fracture.  Will see her back in 3 weeks for repeat evaluation repeat x-rays 3 views of the left ankle only if worse or no better given her pregnancy status and is certainly reasonable to hold off on repeat x-rays unless she has bony tenderness.  We will evaluate her first next visit before proceeding forward with x-rays.  She can use Tylenol icing elevation and rest.  She was given a light duty work note sitdown work only x 3 weeks.  Orders Placed This Encounter    Walking Boot Tall    Ankle Brace, Lace Up or A60      At  the conclusion of the visit there were no further questions by the patient/family regarding their plan of care.  Patient was instructed to call or return with any issues, questions, or concerns regarding their injury and/or treatment plan described above.     01/02/25 at 1:00 PM - Cole C Budinsky, MD    Office: [phone]    This note was prepared using voice recognition software.  The details of this note are correct and have been reviewed, and corrected to the best of my ability.  Some grammatical errors may persist related to the Dragon software.

## 2025-01-03 ENCOUNTER — TELEPHONE (OUTPATIENT)
Dept: ORTHOPEDIC SURGERY | Facility: CLINIC | Age: 25
End: 2025-01-03
Payer: COMMERCIAL

## 2025-01-03 NOTE — TELEPHONE ENCOUNTER
1/3/25 - walking boot - dispensed on 1/2/25  Ankle lace up - to be dispensed at later date for follow up  *All paperwork faxed to [facility] () for approval on both items

## 2025-01-14 ENCOUNTER — TELEPHONE (OUTPATIENT)
Dept: ORTHOPEDIC SURGERY | Facility: CLINIC | Age: 25
End: 2025-01-14
Payer: COMMERCIAL

## 2025-01-22 ENCOUNTER — OFFICE VISIT (OUTPATIENT)
Dept: ORTHOPEDIC SURGERY | Facility: CLINIC | Age: 25
End: 2025-01-22
Payer: COMMERCIAL

## 2025-01-22 ENCOUNTER — HOSPITAL ENCOUNTER (OUTPATIENT)
Dept: RADIOLOGY | Facility: CLINIC | Age: 25
Discharge: HOME | End: 2025-01-22
Payer: COMMERCIAL

## 2025-01-22 DIAGNOSIS — S93.402A SPRAIN OF LEFT ANKLE, UNSPECIFIED LIGAMENT, INITIAL ENCOUNTER: ICD-10-CM

## 2025-01-22 PROCEDURE — 99213 OFFICE O/P EST LOW 20 MIN: CPT | Performed by: FAMILY MEDICINE

## 2025-01-22 PROCEDURE — L1902 AFO ANKLE GAUNTLET PRE OTS: HCPCS | Performed by: FAMILY MEDICINE

## 2025-01-22 PROCEDURE — 73610 X-RAY EXAM OF ANKLE: CPT | Mod: LT

## 2025-01-22 NOTE — PROGRESS NOTES
"  Established Patient Follow-Up Visit    CC:   Chief Complaint   Patient presents with    Left Ankle - Sprain, Worker's Compensation     DOI: 1/2/25  Xrays today        HPI:  Charlotte \"Serena" is a 24 y.o. female returns here today for follow-up visit regarding: Left ankle sprain contusion.  BWC injury.  She returns here today states he is doing well no issues with the boot she does have a little bit of looseness and some instability of the ankle.  She presents here today for follow-up          REVIEW OF SYSTEMS:  GENERAL: Negative for malaise, significant weight loss, fever  MUSCULOSKELETAL: See HPI  NEURO: Negative for numbness / tingling       PHYSICAL EXAM:  -Neuro: Gross sensation intact to the lower extremities bilaterally.  -Extremity: Left ankle demonstrates minimal soft tissue swelling and tenderness over the ATFL CFL ligament subtle laxity with anterior drawer but firm endpoint.  Negative tib-fib squeeze distally and proximally.  Calf is soft nontender negative heel tap calcaneal squeeze Achilles is intact.  Full plantarflexion dorsiflexion eversion inversion negative talar tilt.  Midfoot and distal metatarsals nontender pulses and sensation are intact throughout    IMAGING: Repeat x-rays today demonstrate stable appearing ankle joint mortise no fracture or dislocation.      PROCEDURE: None  Procedures     ASSESSMENT:   Follow-up visit for:  Problem List Items Addressed This Visit    None  Visit Diagnoses       Sprain of left ankle, unspecified ligament, initial encounter        Relevant Orders    XR ankle left 3+ views             PLAN: At this time we will offer the patient follow-up in 6 weeks this allow her time to deliver her baby and recover little bit before having her return.  She may increase activities as tolerated with the brace and she was given home exercises as she was not interested in formal PT for now.  No presence of fracture on x-ray today.  Will plan to see her back in 6 weeks, no need " for x-rays at that time.  Orders Placed This Encounter    XR ankle left 3+ views           At the conclusion of the visit there were no further questions by the patient/family regarding their plan of care.  Patient was instructed to call or return with any issues, questions, or concerns regarding their injury and/or treatment plan described above.     01/22/25 at 11:24 AM - Cole C Budinsky, MD    Office: [phone]    This note was prepared using voice recognition software.  The details of this note are correct and have been reviewed, and corrected to the best of my ability.  Some grammatical errors may persist related to the Dragon software.

## 2025-02-24 ENCOUNTER — LACTATION CONSULT (OUTPATIENT)
Dept: LACTATION | Facility: CLINIC | Age: 25
End: 2025-02-24
Payer: COMMERCIAL

## 2025-02-24 DIAGNOSIS — O92.70 LACTATION PROBLEM (HHS-HCC): Primary | ICD-10-CM

## 2025-02-24 PROCEDURE — 99211 OFF/OP EST MAY X REQ PHY/QHP: CPT | Performed by: EMERGENCY MEDICINE

## 2025-02-24 NOTE — PROGRESS NOTES
Lactation Counseling Note    Subjective:    Charlotte Moody is a 24 y.o. patient referred for lactation counseling. She is here today due to Sore/cracked nipple(s) and Latch issues. She was referred by her  self .     OB HISTORY:   Baby Name: Iliana  Healthcare Provider: OB/GYN Dr Katy Bae  /Para: : 1  Para: 1  Weeks Gestation: 39  Mode of Delivery: Normal vaginal route  Induction/Augmentation  Epidural/General Anesthesia  Delivery Complications: increased bleeding  Maternal Complications: Preeclampsia  Increased bleeding after delivery   Information: Baby's Name: Iliana  : 25  Place of Birth: [place]  Healthcare Provider: Dr Thuy Grigsby  Skin to skin contact: First hour  Birth weight: 7 lb 2 oz    Objective:    BREASTFEEDING ASSESSMENT:   Physical Exam    Breast Assessment: Large, Pendulous, Filling, Compressible, and Readiness to feed  Nipple Assessment/Stage: short, erect, and sore Stage II - Abrasions, shallow crack or fissure, stripe and hydrogels given  Areola: Normal  Feeding Position: breast sandwich, c - hold, cross - cradle, football/seated, skin to skin, both sides, nipple to nose, mother needs assistance with latch/positioning, nose or chin not touching breast, misalignment of baby's head, trunk, and hips, and baby's head too high over breast  Latch: maximum assistance is needed, instructed on deep latch, latch achieved, sucking and swallowing, sucks with long jaw movement, chin and lower lip contact breast first, bursts of sucking, swallowing, and rest, upper lip turned in, clenched jaws, comfortable latch, frequent audible swallows, and with nipple shield  Suck/Feeding: sustained, coordinated suck/swallow/breathe, clenching/biting, tactile stimulation needed, non-nutritional sucking, audible swallowing, audible swallowing with stimulation, nipple shield used, and content after feeding  Infant Feeding Method: Breast milk and Formula  Were you feeding your  "baby only breast milk when you were discharged from the hospital at the time of birth? Yes    Age of baby while still breastfeeding, but added formula: 3 days  Infant Behavior: awake, crying, readiness to feed, feeding cues observed, rooting response, suckles on and off, needs stimulation, content after feeding, and after latch with shield  Infant Information: Ankyloglossia  Receding chin  Good cupping of tongue  Fontanel: flat  Mucous Membranes: moist  Nipple Pain: Pain scale 0-10 (10 most pain): 4  Pain description: sore  Before feeding pain 0-10 (10 most pain): 3-4  After adjustment pain 0-10 (10 most pain): 0  Other pain relief methods: nipple shield; deeper latch positioning  Weight: Weight before feedin lb 8.6 oz  Weight after feedin lb 9.7 oz  Amount of breast milk transferred: 1.1 oz ml  Number of voids in the last 24 hours: 6  Number of stools in the last 24 hours: 2    PATIENT DISCUSSION SUMMARY: Mom, Dad and 3 day old Iliana here for initial visit. Mom states she cannot latch baby, nipples are sore and breasts full. baby weighed 6 lb 8.6 oz prior to feed.  Mom states they did give one bottle of formula overnight because she was crying and would not latch. Baby crying and anterior frenulum easily visible without physically assessing. Labial restriction noted with blanching of gums when flanged, notch noted in gum and sucking blister on upper lip. Findings shown to parents. Baby unable to sustain  during suck assessment when chin drawn downward. Infant's jaw very \"tight\" and resists being drawn downward. Assisted with attempts to latch directly, but infant unable to sustain latch. Discussed the impact of these findings on baby's ability to obtain and sustain latch at breast.  Discussed how and why using a nipple shield may help. Shown how to apply 24mm nipple shield. Baby latched with sustained sucks and swallows.  Mom shown breast massage/compression to keep sucking effective for as long as " "possible. Baby transferred 0.6 per test weight. Assisted to opposite breast with nipple shield in similar manner. Baby transferred an additional 0.5 oz. Total this feeding was 1.1 oz which is very good for 3 day old. Baby content after feeding. Encouraged parents to research tongue and lip restrictions and impact on breast feeding as well as long term. Encouraged discussion with pediatrician for possible referral to ENT or pediatric dentist. For now, plan is to latch baby to breast using nipple shield q2-3 hours, but at least 8x/ day. Mom encouraged to pump if breasts approx. 4x /day to assure that milk \"keeps moving\" to avoid engorgement, maintain milk supply and to have expressed milk to give as supplement if necessary. Given feeding log to record feeds and output. Encouraged skin to skin as much as possible, discussed effects on length of effective sucking at breast, milk supply and weight gain. Parents plan to discuss issues with pediatrician, most likely schedule for release and will call here to make an appt. The next day.     LACTATION PROBLEMS AND STRATEGIES:  Problems latching baby to breast: Baby should latch to areola (dark area) not just the nipple.  Baby's lips should be flanged outward.  Bring the baby up to the level of your breast by putting a pillow under the baby.  Do not use bottles or pacifiers until latching on well.  Dribble milk over the nipple or express milk so that baby can taste it  Encourage a deeper latch with the asymetrical latch- lining baby's nose opposite mother's nipple.  Encourage nipple to stand up prior to feeing by pumping, nipple rolling or by brief application of ice.  Gently tickle your baby's lip with your nipple to encourage your baby to open his/her mouth wide.  Hold baby so that your breast is positioned deep in the baby's mouth.  Hold your baby close, tummy to tummy.  If baby does not latch to breast, express breast milk.  If engorged, express some milk to soften " "breast.  If the baby is not latched on well, break seal and gently try again.  Keep baby skin to skin and watch for feeding cues.  Massage breast to start milk-ejection reflex.  Place nipple and at least 1 inch of areola into baby's mouth.  Place your hand behind the baby's neck and shoulder.  Do not force baby's head into breast.  Put baby in the football hold or clutch hold, supporting his/her neck and head in sniffing position to open his/her throat.  Shape breast into oval shape (sandwich hold)  for deep latch.  Try different feeding positions (cradle, football, side etc.).  Use a breast feeding pillow to bring baby up to the level of the breast.  Use nipple shield and monitor baby's weight gain and output.  Use semi-reclined feeding position to allow baby to take an active role and trigger baby's inborn feeding behavior.  Use your hand to support your breast during feeding.  When your baby's mouth is wide open, quickly pull baby into your breast.  Your baby's chin should be pressed into your breast.  Sore nipple (early): After feeding use approved nipple cream or hydrogel pads  Allow baby to self latch  Begin feeding with least sore breast  Between feedings use breast shells with large openings to reduce clothing friction or pressure  Break suction with finger before removing baby from breast  Breastfeed first on the least sore breast until milk releases, then move baby gently to the more painful breast  Check positioning and attachment throughout the feeding  Contact physician for nipple evaluation and treatment  Discontinue use of bottles and pacifiers until soreness subsides  Do not rub expressed milk into broken skin  Do not use plastic lined breast pads. Change breast pads frequently  Dr. Lopez's \"Sore Nipples-Engorgement\" Lopez publishing 2005  Expose nipples to sunlight  If engorged, express to soften areola  If experiencing burning or stinging nipple, check for thrush  If nipple pain is too intense to " continue breastfeeding, express breastmilk and feed baby expressed breastmilk  If nipple skin is broken, apply a thin coating of a topical antibiotic to prevent infection  Massage breasts and hand express a small amount of milk to encourage let down before feeding  PI sheet on BF with sore and cracked nipples given  Place baby skin-to-skin on your bare chest  Purified lanolin ointment maybe used on nipples after feedings for comfort  Stimulate a milk ejection by expressing some milk before putting baby to breast  Try holding the baby in different positions (vertical, horizontal, or at 45 degree angle ) for feedings  Use nipple shield to reduce pain during breastfeeding  Use relaxation and breathing techniques to help with pain of latch on  Wash hands before touching breasts and nipples. Rinse nipples with clean warm water  Watch for feeding cues and feed baby  When sore on bottom, make sure lower lip is flanged out and relaxed  When sore on top, correct the latch on and check tongue position  When the tip is sore, center the nipple when latching on, check for tongue thrusting, and check for short frenulum  PATIENT INSTRUCTION HANDOUTS GIVEN:   Nipple shields (PI# [id number])  Breastfeeding: Sore and cracked nipples (PI# [id number])  Breastfeeding: Engorgement (PI# [id number])  Breastfeeding and Tongue-tie  LACTATION EDUCATION:  Correct Positioning and Correct Latch On

## 2025-02-25 NOTE — PATIENT INSTRUCTIONS
" Mom, Dad and 3 day old Iliana here for initial visit. Mom states she cannot latch baby, nipples are sore and breasts full. baby weighed 6 lb 8.6 oz prior to feed.  Mom states they did give one bottle of formula overnight because she was crying and would not latch. Baby crying and anterior frenulum easily visible without physically assessing. Labial restriction noted with blanching of gums when flanged, notch noted in gum and sucking blister on upper lip. Findings shown to parents. Baby unable to sustain  during suck assessment when chin drawn downward. Infant's jaw very \"tight\" and resists being drawn downward. Assisted with attempts to latch directly, but infant unable to sustain latch. Discussed the impact of these findings on baby's ability to obtain and sustain latch at breast.  Discussed how and why using a nipple shield may help. Shown how to apply 24mm nipple shield. Baby latched with sustained sucks and swallows.  Mom shown breast massage/compression to keep sucking effective for as long as possible. Baby transferred 0.6 per test weight. Assisted to opposite breast with nipple shield in similar manner. Baby transferred an additional 0.5 oz. Total this feeding was 1.1 oz which is very good for 3 day old. Baby content after feeding. Encouraged parents to research tongue and lip restrictions and impact on breast feeding as well as long term. Encouraged discussion with pediatrician for possible referral to ENT or pediatric dentist. For now, plan is to latch baby to breast using nipple shield q2-3 hours, but at least 8x/ day. Mom encouraged to pump if breasts approx. 4x /day to assure that milk \"keeps moving\" to avoid engorgement, maintain milk supply and to have expressed milk to give as supplement if necessary. Given feeding log to record feeds and output. Encouraged skin to skin as much as possible, discussed effects on length of effective sucking at breast, milk supply and weight gain. Parents plan to " discuss issues with pediatrician, most likely schedule for release and will call here to make an appt. The next day.

## 2025-02-27 ENCOUNTER — LACTATION CONSULT (OUTPATIENT)
Dept: LACTATION | Facility: CLINIC | Age: 25
End: 2025-02-27
Payer: COMMERCIAL

## 2025-02-27 DIAGNOSIS — O92.70 LACTATION PROBLEM (HHS-HCC): Primary | ICD-10-CM

## 2025-02-27 PROCEDURE — 99211 OFF/OP EST MAY X REQ PHY/QHP: CPT | Performed by: EMERGENCY MEDICINE

## 2025-02-28 NOTE — PROGRESS NOTES
Lactation Counseling Note    Subjective:    Charlotte Moody is a 24 y.o. patient referred for lactation counseling. She is here today due to Sore/cracked nipple(s) and Latch issues. She was referred by her  self .     OB HISTORY:   Mode of Delivery: Normal vaginal route    Objective:    BREASTFEEDING ASSESSMENT:   Physical Exam    Breast Assessment: Large, Pendulous, Symmetrical, Filling, Soft, Warm, and Compressible  Nipple Assessment/Stage: blistered, bruised, erect, compression stripe, and sore Stage I - Pain or irritation with no skin breakdown  Areola: Normal  Feeding Position: cradle, skin to skin, and both sides  Latch: moderate assistance is needed, instructed on deep latch, latch achieved, latch achieved after repeated attempts, comfortable with no pain, bursts of sucking, swallowing, and rest, comfortable latch, and minimal audible swallowing  Suck/Feeding: unsustained, audible swallowing, swallowing intermittently only with encouragement, and nipple shield used  Alternative Feeding Methods: nursing at the breast and feeding expressed breast milk  Infant Feeding Method: Breast milk and Formula  Infant Behavior: quiet alert, readiness to feed, feeding cues observed, and rooting response  Infant Information: Post status frenotomy  Receding chin  Poor occlusion of lips around areola  Tongue protrudes over alveolar ridge  Good cupping of tongue  Good lateral movement of the tongue  Nipple Pain: Pain scale 0-10 (10 most pain): 1  Weight: Weight before feedinlbs 11.5  Weight after feedinlbs 12.2oz  Amount of breast milk transferred: 0.8oz ml  Number of voids in the last 24 hours: 6-8  Number of stools in the last 24 hours: 2    PATIENT DISCUSSION SUMMARY: Mom here for follow-up visit post frenectomy baby up 2.9 ounces in 3 days mom states she has mostly been pumping and bottlefeeding has put baby to breast a few times mom does not feel she sees a difference after the revision reviewed  "stretches with mom did have did open up the wound a little with the stretch baby is a rapid healer.  Tried to nurse without the shield baby unable to latch mom nursed baby on both sides and transferred 1 ounce which is okay.  Encouraged mom to continue using shield and to follow-up on Monday for weight check.  Mom will continue to pump after every feeding and give baby half an ounce to an ounce.    LACTATION PROBLEMS AND STRATEGIES:  Low milk supply: \"Power Pump\" (only for full term healthy babies)  Add extra feedings   Allow unrestricted breastfeeding the first few days per week  Check efficiency/comfort of pump and fit of pump breast flange  Check for pregnancy and endocrine levels  Count wet and soiled diapers. By the 5th day, 6 or more wet diapers and at least 3-4 stools  Do not go long periods (greater than 5 hours) without feeding or pumping  Finish one breast entirely before offering the second  Follow physician instructions regarding supplementation  Gave PI sheet # 162 \"Breastfeeding: Tips to Increase Your Milk Supply\"   Have the baby weighed. Weight gain during the first 6 months is 4 to 8 ounces per week  If baby is sleepy, wake for feedings  If baby is very hungry, try supplementing with a little EBM prior to putting baby to breast  Increase amount of time spent \"skin to skin\" with baby  Limit or end the use of a pacifier  Listen to music or guided relaxation during pumping  Make sure the baby takes the breast deeply and transfers milk effectively  Nurse a minimum of 10 -12 times per day. Watch for feeding cues  Pump after feedings as directed to increase milk supply  [facility] grade double electrical breast pump  Talk to your health provider concerning the use of herbs or medication to increase milk supply  The baby may be experiencing a growth spurt. Feed frequently until milk supply increases  Use alternate massage during feedings  Use breast massage during pumping and hand expression after " pumping  Warm pump flange or use warm compress on breast when pumping  Do not wait for baby to cry before feeding. Watch for feeding cues-opens mouth, sucking and body movements  Problems latching baby to breast: Baby should latch to areola (dark area) not just the nipple.  Baby's lips should be flanged outward.  Bring the baby up to the level of your breast by putting a pillow under the baby.  Do not use bottles or pacifiers until latching on well.  Dribble milk over the nipple or express milk so that baby can taste it  Encourage a deeper latch with the asymetrical latch- lining baby's nose opposite mother's nipple.  Encourage nipple to stand up prior to feeing by pumping, nipple rolling or by brief application of ice.  Gently tickle your baby's lip with your nipple to encourage your baby to open his/her mouth wide.  Hold baby so that your breast is positioned deep in the baby's mouth.  Hold your baby close, tummy to tummy.  If baby does not latch to breast, express breast milk.  If engorged, express some milk to soften breast.  If the baby is not latched on well, break seal and gently try again.  Keep baby skin to skin and watch for feeding cues.  Massage breast to start milk-ejection reflex.  Place nipple and at least 1 inch of areola into baby's mouth.  Place your hand behind the baby's neck and shoulder.  Do not force baby's head into breast.  Put baby in the football hold or clutch hold, supporting his/her neck and head in sniffing position to open his/her throat.  Shape breast into oval shape (sandwich hold)  for deep latch.  Try different feeding positions (cradle, football, side etc.).  Use a breast feeding pillow to bring baby up to the level of the breast.  Use nipple shield and monitor baby's weight gain and output.  Use semi-reclined feeding position to allow baby to take an active role and trigger baby's inborn feeding behavior.  Use your hand to support your breast during feeding.  When your baby's  mouth is wide open, quickly pull baby into your breast.  Your baby's chin should be pressed into your breast.  PATIENT INSTRUCTION HANDOUTS GIVEN:      LACTATION EDUCATION:  Correct Positioning and Correct Latch On

## 2025-03-03 ENCOUNTER — LACTATION CONSULT (OUTPATIENT)
Dept: LACTATION | Facility: CLINIC | Age: 25
End: 2025-03-03
Payer: COMMERCIAL

## 2025-03-03 DIAGNOSIS — O92.70 LACTATION PROBLEM (HHS-HCC): Primary | ICD-10-CM

## 2025-03-03 PROCEDURE — 99211 OFF/OP EST MAY X REQ PHY/QHP: CPT | Performed by: EMERGENCY MEDICINE

## 2025-03-04 NOTE — PROGRESS NOTES
Lactation Counseling Note    Subjective:    Charlotte Moody is a 24 y.o. patient referred for lactation counseling. She is here today due to  weight check . She was referred by her  [facility] .     OB HISTORY:   Mode of Delivery: Normal vaginal route    Objective:    BREASTFEEDING ASSESSMENT:   Physical Exam    Breast Assessment: Large, Pendulous, Symmetrical, Filling, Soft, and Warm  Nipple Assessment/Stage: intact, erect, and rounded after feeding Stage I - Pain or irritation with no skin breakdown  Areola: Normal  Feeding Position: baby - led, cradle, laid back, skin to skin, switch nursing, and mother needs assistance with latch/positioning  Latch: moderate assistance is needed, instructed on deep latch, deep latch obtained, comfortable with no pain, bursts of sucking, swallowing, and rest, flanged lips, and comfortable latch  Suck/Feeding: sustained, audible swallowing, nipple shield used, and content after feeding  Alternative Feeding Methods: nursing at the breast and paced bottle  Infant Feeding Method: Breast milk only  Infant Behavior: awake, active alert, readiness to feed, feeding cues observed, and content after feeding  Infant Information: Post status frenotomy  Receding chin  Good cupping of tongue  Good lateral movement of the tongue  Able to elevate tongue to roof of mouth  Nipple Pain: Pain scale 0-10 (10 most pain): 0    PATIENT DISCUSSION SUMMARY: Here for follow-up weight check post frenectomy baby is up 6.5 ounces in the past 3 days mom admits she was giving a lot of bottles because she gets frustrated when she cannot latch the baby review.  Reviewed with mom latching baby with the shield using a breast rest and a syringe some pumped milk baby likes instant gratification showed mom how to latch baby by putting drops of milk on the shield and getting baby to latch mom's states this seemed easier.  Told mom she was in charge and if she felt herself feeling frustrated the baby would get  frustrated she needed to stop what she was doing and talk through it dad was there and said that the 3 of them would work together and try to just do breast.  Mom to follow-up on Wednesday.  Oh mom is only asked giving exclusive breastmilk she no longer needs to use formula she was happy to report.    LACTATION PROBLEMS AND STRATEGIES:  Problems latching baby to breast: Baby should latch to areola (dark area) not just the nipple.  Baby's lips should be flanged outward.  Bring the baby up to the level of your breast by putting a pillow under the baby.  Do not use bottles or pacifiers until latching on well.  Dribble milk over the nipple or express milk so that baby can taste it  Encourage a deeper latch with the asymetrical latch- lining baby's nose opposite mother's nipple.  Encourage nipple to stand up prior to feeing by pumping, nipple rolling or by brief application of ice.  Gently tickle your baby's lip with your nipple to encourage your baby to open his/her mouth wide.  Hold baby so that your breast is positioned deep in the baby's mouth.  Hold your baby close, tummy to tummy.  If baby does not latch to breast, express breast milk.  If engorged, express some milk to soften breast.  If the baby is not latched on well, break seal and gently try again.  Keep baby skin to skin and watch for feeding cues.  Massage breast to start milk-ejection reflex.  Place nipple and at least 1 inch of areola into baby's mouth.  Place your hand behind the baby's neck and shoulder.  Do not force baby's head into breast.  Put baby in the football hold or clutch hold, supporting his/her neck and head in sniffing position to open his/her throat.  Shape breast into oval shape (sandwich hold)  for deep latch.  Try different feeding positions (cradle, football, side etc.).  Use a breast feeding pillow to bring baby up to the level of the breast.  Use nipple shield and monitor baby's weight gain and output.  Use semi-reclined feeding  position to allow baby to take an active role and trigger baby's inborn feeding behavior.  Use your hand to support your breast during feeding.  When your baby's mouth is wide open, quickly pull baby into your breast.  Your baby's chin should be pressed into your breast.  PATIENT INSTRUCTION HANDOUTS GIVEN:      LACTATION EDUCATION:  Correct Positioning and Correct Latch On

## 2025-03-04 NOTE — PATIENT INSTRUCTIONS
Mom here for follow-up visit post frenectomy baby up 2.9 ounces in 3 days mom states she has mostly been pumping and bottlefeeding has put baby to breast a few times mom does not feel she sees a difference after the revision reviewed stretches with mom did have did open up the wound a little with the stretch baby is a rapid healer.  Tried to nurse without the shield baby unable to latch mom nursed baby on both sides and transferred 1 ounce which is okay.  Encouraged mom to continue using shield and to follow-up on Monday for weight check.  Mom will continue to pump after every feeding and give baby half an ounce to an ounce.

## 2025-03-05 ENCOUNTER — LACTATION CONSULT (OUTPATIENT)
Dept: LACTATION | Facility: CLINIC | Age: 25
End: 2025-03-05
Payer: COMMERCIAL

## 2025-03-05 DIAGNOSIS — O92.70 LACTATION PROBLEM (HHS-HCC): Primary | ICD-10-CM

## 2025-03-05 PROCEDURE — 99211 OFF/OP EST MAY X REQ PHY/QHP: CPT | Performed by: EMERGENCY MEDICINE

## 2025-03-05 NOTE — PROGRESS NOTES
Lactation Counseling Note    Subjective:    Charlotte Moody is a 24 y.o. patient referred for lactation counseling. She is here today due to  weighted feed . She was referred by her  friend .     OB HISTORY:   Mode of Delivery: Normal vaginal route    Objective:    BREASTFEEDING ASSESSMENT:   Physical Exam    Breast Assessment: Large, Pendulous, Symmetrical, Filling, Soft, Warm, and Compressible  Nipple Assessment/Stage: intact, erect, and rounded after feeding    Areola: Normal  Feeding Position: cradle, laid back, skin to skin, switch nursing, and mother demonstrates good positioning  Latch: minimal assistance is needed, deep latch obtained, comfortable with no pain, chin and lower lip contact breast first, bursts of sucking, swallowing, and rest, flanged lips, and correct tongue position  Suck/Feeding: sustained, baby led rhythmically, audible swallowing, nipple shield used, and content after feeding  Alternative Feeding Methods: nursing at the breast and paced bottle  Infant Feeding Method: Breast milk only  Infant Behavior: awake, active alert, fussy, readiness to feed, and content after feeding  Infant Information: Post status frenotomy  Palate- high/arch/bubble/normal  Tongue protrudes over alveolar ridge  Good cupping of tongue  Good lateral movement of the tongue  Able to elevate tongue to roof of mouth  Nipple Pain: Pain scale 0-10 (10 most pain): 0  Weight: Weight before feedinlbs 2.1oz  Weight after feedinlbs 3.7oz  Amount of breast milk transferred: 1.6oz ml    PATIENT DISCUSSION SUMMARY: Here for follow-up weight check baby only gained 0.1 ounces in the past 2 days.  Mom stated she did mostly all breast-feeding and she felt the baby did very well.  She thinks because the baby gained so much weight last time it was just adjusting because baby was satisfied had 8-10 wet diapers and 2 or 3 poops every day and seemed very content baby today transferred 1.6 ounces but mom stated she had  "about a half an ounce to an ounce by half an hour ago which would be the average 2 to 2-1/2 ounces that the baby normally took.  Mom very confident and positive about this week and will follow-up for weight check on Friday    LACTATION PROBLEMS AND STRATEGIES:  Low milk supply: \"Power Pump\" (only for full term healthy babies)  Add extra feedings   Allow unrestricted breastfeeding the first few days per week  Check efficiency/comfort of pump and fit of pump breast flange  Check for pregnancy and endocrine levels  Count wet and soiled diapers. By the 5th day, 6 or more wet diapers and at least 3-4 stools  Do not go long periods (greater than 5 hours) without feeding or pumping  Finish one breast entirely before offering the second  Follow physician instructions regarding supplementation  Gave PI sheet # 162 \"Breastfeeding: Tips to Increase Your Milk Supply\"   Have the baby weighed. Weight gain during the first 6 months is 4 to 8 ounces per week  If baby is sleepy, wake for feedings  If baby is very hungry, try supplementing with a little EBM prior to putting baby to breast  Increase amount of time spent \"skin to skin\" with baby  Limit or end the use of a pacifier  Listen to music or guided relaxation during pumping  Make sure the baby takes the breast deeply and transfers milk effectively  Nurse a minimum of 10 -12 times per day. Watch for feeding cues  Pump after feedings as directed to increase milk supply  [facility] grade double electrical breast pump  Talk to your health provider concerning the use of herbs or medication to increase milk supply  The baby may be experiencing a growth spurt. Feed frequently until milk supply increases  Use alternate massage during feedings  Use breast massage during pumping and hand expression after pumping  Warm pump flange or use warm compress on breast when pumping  Do not wait for baby to cry before feeding. Watch for feeding cues-opens mouth, sucking and body " movements  PATIENT INSTRUCTION HANDOUTS GIVEN:      LACTATION EDUCATION:  Correct Positioning and Correct Latch On

## 2025-03-06 NOTE — PATIENT INSTRUCTIONS
Here for follow-up weight check post frenectomy baby is up 6.5 ounces in the past 3 days mom admits she was giving a lot of bottles because she gets frustrated when she cannot latch the baby review.  Reviewed with mom latching baby with the shield using a breast rest and a syringe some pumped milk baby likes instant gratification showed mom how to latch baby by putting drops of milk on the shield and getting baby to latch mom's states this seemed easier.  Told mom she was in charge and if she felt herself feeling frustrated the baby would get frustrated she needed to stop what she was doing and talk through it dad was there and said that the 3 of them would work together and try to just do breast.  Mom to follow-up on Wednesday.  Oh mom is only asked giving exclusive breastmilk she no longer needs to use formula she was happy to report.

## 2025-03-06 NOTE — PATIENT INSTRUCTIONS
Here for follow-up weight check baby only gained 0.1 ounces in the past 2 days.  Mom stated she did mostly all breast-feeding and she felt the baby did very well.  She thinks because the baby gained so much weight last time it was just adjusting because baby was satisfied had 8-10 wet diapers and 2 or 3 poops every day and seemed very content baby today transferred 1.6 ounces but mom stated she had about a half an ounce to an ounce by half an hour ago which would be the average 2 to 2-1/2 ounces that the baby normally took.  Mom very confident and positive about this week and will follow-up for weight check on Friday

## 2025-03-10 ENCOUNTER — APPOINTMENT (OUTPATIENT)
Dept: ORTHOPEDIC SURGERY | Facility: CLINIC | Age: 25
End: 2025-03-10
Payer: COMMERCIAL

## 2025-05-20 ENCOUNTER — OFFICE VISIT (OUTPATIENT)
Dept: PRIMARY CARE | Facility: CLINIC | Age: 25
End: 2025-05-20
Payer: COMMERCIAL

## 2025-05-20 VITALS
OXYGEN SATURATION: 98 % | SYSTOLIC BLOOD PRESSURE: 126 MMHG | DIASTOLIC BLOOD PRESSURE: 70 MMHG | HEART RATE: 70 BPM | WEIGHT: 290 LBS | HEIGHT: 66 IN | TEMPERATURE: 97.3 F | BODY MASS INDEX: 46.61 KG/M2

## 2025-05-20 DIAGNOSIS — R11.2 NAUSEA AND VOMITING, UNSPECIFIED VOMITING TYPE: Primary | ICD-10-CM

## 2025-05-20 DIAGNOSIS — D62 POSTOPERATIVE ANEMIA DUE TO ACUTE BLOOD LOSS: ICD-10-CM

## 2025-05-20 PROBLEM — Q24.8 PERICARDIAL CYST (HHS-HCC): Status: RESOLVED | Noted: 2023-07-23 | Resolved: 2025-05-20

## 2025-05-20 PROCEDURE — 99214 OFFICE O/P EST MOD 30 MIN: CPT | Performed by: INTERNAL MEDICINE

## 2025-05-20 PROCEDURE — 3008F BODY MASS INDEX DOCD: CPT | Performed by: INTERNAL MEDICINE

## 2025-05-20 RX ORDER — PROCHLORPERAZINE MALEATE 10 MG
10 TABLET ORAL 3 TIMES DAILY PRN
Qty: 20 TABLET | Refills: 0 | Status: SHIPPED | OUTPATIENT
Start: 2025-05-20 | End: 2025-07-19

## 2025-05-20 ASSESSMENT — ENCOUNTER SYMPTOMS
CONSTITUTIONAL NEGATIVE: 1
ALLERGIC/IMMUNOLOGIC NEGATIVE: 1
RESPIRATORY NEGATIVE: 1
EYES NEGATIVE: 1
ENDOCRINE NEGATIVE: 1
NEUROLOGICAL NEGATIVE: 1

## 2025-05-20 ASSESSMENT — PATIENT HEALTH QUESTIONNAIRE - PHQ9
1. LITTLE INTEREST OR PLEASURE IN DOING THINGS: NOT AT ALL
2. FEELING DOWN, DEPRESSED OR HOPELESS: NOT AT ALL
SUM OF ALL RESPONSES TO PHQ9 QUESTIONS 1 & 2: 0

## 2025-05-20 NOTE — PROGRESS NOTES
"Subjective   Patient ID: Evelyn Moody is a 24 y.o. female who presents for Hospital Follow-up (Patient here for hospital discharge . Nausea and constant bleeding ).    HPI   Patient here for office visit she was discharged from the hospital she was admitted for possible UTI but urine cultures were negative so she was discharged home on Zofran which is not helping her she has no fever chills or dysuria.  Of note she had delivered at [facility] on 21st February it was vaginal delivery but she had second degree vaginal tear for which she required suturing.  Subsequently she also had visit for acute blood loss anemia and preeclampsia.  She is not having any abdominal pain she is still having some spotting but that has improved.  Main issue is with nausea she does have some left lower quadrant tenderness but abdomen is soft  Review of Systems   Constitutional: Negative.    HENT: Negative.     Eyes: Negative.    Respiratory: Negative.     Endocrine: Negative.    Allergic/Immunologic: Negative.    Neurological: Negative.    All other systems reviewed and are negative.      Objective   /70 (BP Location: Left arm, Patient Position: Sitting, BP Cuff Size: Adult long)   Pulse 70   Temp 36.3 °C (97.3 °F) (Temporal)   Ht 1.676 m (5' 6\")   Wt 132 kg (290 lb)   SpO2 98%   Breastfeeding Yes   BMI 46.81 kg/m²     Physical Exam  Vitals reviewed.   Constitutional:       Appearance: Normal appearance.   HENT:      Head: Normocephalic and atraumatic.   Eyes:      Conjunctiva/sclera: Conjunctivae normal.   Cardiovascular:      Rate and Rhythm: Normal rate and regular rhythm.      Pulses: Normal pulses.      Heart sounds: Normal heart sounds.   Pulmonary:      Effort: Pulmonary effort is normal.      Breath sounds: Normal breath sounds.   Abdominal:      Palpations: Abdomen is soft.      Tenderness: There is no guarding or rebound.      Comments: Mild left lower quadrant tenderness   Musculoskeletal:      Right " lower leg: No edema.      Left lower leg: No edema.   Neurological:      General: No focal deficit present.      Mental Status: She is alert and oriented to person, place, and time.   Psychiatric:         Mood and Affect: Mood normal.         Behavior: Behavior normal.         Thought Content: Thought content normal.         Assessment/Plan   Problem List Items Addressed This Visit           ICD-10-CM    Nausea & vomiting - Primary R11.2    Relevant Medications    prochlorperazine (Compazine) 10 mg tablet    Other Relevant Orders    Referral to Gastroenterology    CBC and Auto Differential     Other Visit Diagnoses         Codes      Postoperative anemia due to acute blood loss     D62          Patient advised to take iron every other day with vitamin C.  Compazine prescription given to take as needed nausea vomiting.  She has mild tenderness in the left lower quadrant if her pain gets worse she will have to go back to emergency room.  Last hemoglobin was 9.6 repeat one should be checked in 6 to 8 weeks.  For GYN issues follow-up with her OB/GYN.

## 2025-06-20 DIAGNOSIS — R11.2 NAUSEA AND VOMITING, UNSPECIFIED VOMITING TYPE: ICD-10-CM

## 2025-07-01 ENCOUNTER — APPOINTMENT (OUTPATIENT)
Dept: PRIMARY CARE | Facility: CLINIC | Age: 25
End: 2025-07-01
Payer: COMMERCIAL